# Patient Record
Sex: MALE | Race: OTHER | URBAN - METROPOLITAN AREA
[De-identification: names, ages, dates, MRNs, and addresses within clinical notes are randomized per-mention and may not be internally consistent; named-entity substitution may affect disease eponyms.]

---

## 2023-08-31 ENCOUNTER — OFFICE VISIT (OUTPATIENT)
Dept: AUDIOLOGY | Facility: CLINIC | Age: 78
End: 2023-08-31

## 2023-08-31 ENCOUNTER — OFFICE VISIT (OUTPATIENT)
Dept: OTOLARYNGOLOGY | Facility: CLINIC | Age: 78
End: 2023-08-31

## 2023-08-31 DIAGNOSIS — H91.20 SUDDEN-ONSET SENSORINEURAL HEARING LOSS: ICD-10-CM

## 2023-08-31 DIAGNOSIS — H93.13 TINNITUS OF BOTH EARS: Primary | ICD-10-CM

## 2023-08-31 DIAGNOSIS — H93.12 TINNITUS, LEFT: Primary | ICD-10-CM

## 2023-08-31 PROCEDURE — 92567 TYMPANOMETRY: CPT | Performed by: AUDIOLOGIST

## 2023-08-31 PROCEDURE — 1159F MED LIST DOCD IN RCRD: CPT | Performed by: AUDIOLOGIST

## 2023-08-31 PROCEDURE — 92557 COMPREHENSIVE HEARING TEST: CPT | Performed by: AUDIOLOGIST

## 2023-08-31 PROCEDURE — 99203 OFFICE O/P NEW LOW 30 MIN: CPT | Performed by: OTOLARYNGOLOGY

## 2023-08-31 RX ORDER — HYDROXYZINE HYDROCHLORIDE 25 MG/1
TABLET, FILM COATED ORAL
COMMUNITY
Start: 2023-03-14

## 2023-08-31 RX ORDER — MELOXICAM 15 MG/1
1 TABLET ORAL DAILY PRN
COMMUNITY

## 2023-08-31 RX ORDER — TRIAMCINOLONE ACETONIDE 1 MG/G
CREAM TOPICAL
COMMUNITY

## 2023-08-31 RX ORDER — ROSUVASTATIN CALCIUM 10 MG/1
1 TABLET, COATED ORAL DAILY
COMMUNITY
Start: 2022-04-03

## 2023-08-31 RX ORDER — SILDENAFIL 100 MG/1
TABLET, FILM COATED ORAL
COMMUNITY

## 2023-08-31 RX ORDER — GABAPENTIN 300 MG/1
300 CAPSULE ORAL 3 TIMES DAILY
COMMUNITY

## 2023-08-31 RX ORDER — ALFUZOSIN HYDROCHLORIDE 10 MG/1
1 TABLET, EXTENDED RELEASE ORAL DAILY
COMMUNITY
Start: 2022-09-04

## 2023-08-31 RX ORDER — FINASTERIDE 5 MG/1
1 TABLET, FILM COATED ORAL DAILY
COMMUNITY

## 2023-08-31 RX ORDER — HYDROCODONE BITARTRATE AND ACETAMINOPHEN 7.5; 325 MG/1; MG/1
TABLET ORAL
COMMUNITY

## 2023-08-31 RX ORDER — HYDROCODONE BITARTRATE AND ACETAMINOPHEN 10; 325 MG/1; MG/1
TABLET ORAL
COMMUNITY

## 2023-08-31 RX ORDER — ASPIRIN 81 MG/1
TABLET ORAL 2 TIMES DAILY WITH MEALS
COMMUNITY

## 2023-09-01 ENCOUNTER — TELEPHONE (OUTPATIENT)
Dept: OTOLARYNGOLOGY | Facility: CLINIC | Age: 78
End: 2023-09-01

## 2023-09-01 RX ORDER — PREDNISONE 10 MG/1
TABLET ORAL
Qty: 44 TABLET | Refills: 0 | Status: SHIPPED | OUTPATIENT
Start: 2023-09-01

## 2023-09-14 ENCOUNTER — OFFICE VISIT (OUTPATIENT)
Dept: OTOLARYNGOLOGY | Facility: CLINIC | Age: 78
End: 2023-09-14

## 2023-09-14 ENCOUNTER — OFFICE VISIT (OUTPATIENT)
Dept: AUDIOLOGY | Facility: CLINIC | Age: 78
End: 2023-09-14

## 2023-09-14 DIAGNOSIS — H93.12 TINNITUS, LEFT: Primary | ICD-10-CM

## 2023-09-14 DIAGNOSIS — H91.8X3 OTHER SPECIFIED HEARING LOSS OF BOTH EARS: Primary | ICD-10-CM

## 2023-09-14 PROCEDURE — 92557 COMPREHENSIVE HEARING TEST: CPT | Performed by: AUDIOLOGIST

## 2023-09-14 PROCEDURE — 1160F RVW MEDS BY RX/DR IN RCRD: CPT | Performed by: OTOLARYNGOLOGY

## 2023-09-14 PROCEDURE — 99214 OFFICE O/P EST MOD 30 MIN: CPT | Performed by: OTOLARYNGOLOGY

## 2023-09-14 PROCEDURE — 1159F MED LIST DOCD IN RCRD: CPT | Performed by: OTOLARYNGOLOGY

## 2024-04-18 ENCOUNTER — OFFICE VISIT (OUTPATIENT)
Dept: AUDIOLOGY | Facility: CLINIC | Age: 79
End: 2024-04-18

## 2024-04-18 ENCOUNTER — OFFICE VISIT (OUTPATIENT)
Dept: OTOLARYNGOLOGY | Facility: CLINIC | Age: 79
End: 2024-04-18
Payer: MEDICARE

## 2024-04-18 DIAGNOSIS — H91.20 SUDDEN-ONSET SENSORINEURAL HEARING LOSS: ICD-10-CM

## 2024-04-18 DIAGNOSIS — H93.13 TINNITUS OF BOTH EARS: ICD-10-CM

## 2024-04-18 DIAGNOSIS — H91.90 HEARING LOSS, UNSPECIFIED HEARING LOSS TYPE, UNSPECIFIED LATERALITY: Primary | ICD-10-CM

## 2024-04-18 DIAGNOSIS — H91.8X3 ASYMMETRICAL HEARING LOSS: Primary | ICD-10-CM

## 2024-04-18 PROCEDURE — 92557 COMPREHENSIVE HEARING TEST: CPT | Performed by: AUDIOLOGIST

## 2024-04-18 PROCEDURE — 92567 TYMPANOMETRY: CPT | Performed by: AUDIOLOGIST

## 2024-04-18 PROCEDURE — 99213 OFFICE O/P EST LOW 20 MIN: CPT | Performed by: OTOLARYNGOLOGY

## 2024-04-18 NOTE — PROGRESS NOTES
Srinivasan Ontiveros is a 79 year old male.    Chief Complaint   Patient presents with    Follow - Up     Patient is here due to hearing loss follow up and repeat follow up       HISTORY OF PRESENT ILLNESS  He presents with a history of ringing in his left ear that started on July 2.  States that he was in [place] at the time and went to see several physicians who gave him certain medications without any improvement in his symptoms.  States that he finally saw an ENT about 6 weeks out from having this problem and was told that he was too late to have any type of injections intratympanicly.  He now feels that his hearing has not changed and is here for another evaluation and recommendation.  Has not been on any steroids so far.  No other significant signs, symptoms or complaints other than hearing loss and tinnitus.  Known hearing loss on the right side in the past.  Audiogram performed today demonstrates normal downsloping to mild downsloping to moderate sensory hearing loss on the right and mild downsloping to severe sensory hearing loss on the left.  Speech discrimination scores of 40% on the left and 96% on the right.  Normal tympanograms.      9/15/23 last visit he was started on a prednisone burst and taper and fortunately has had no real improvement in his hearing.  Audiogram was repeated today demonstrating essentially unchanged left-sided hearing.  This started back in early July and unfortunately was treated for other problems before he met with an ENT who offered him treatment but stated that because he was more than a month out that he would not likely have good response to the treatment.  Here to discuss further management.  Repeat audiogram performed today demonstrates essentially no change in his left-sided hearing loss.  Frustrated by the chronic pain at that ear.     4/18/24 he feels that his hearing may be slightly worse but he is more frustrated with the constant incessant ringing in the left ear.  History  of sudden onset hearing loss seen in [place] by physician not treated for several weeks and finally saw an ENT after about 3 weeks and was offered no treatment due to the fact that it been too long.  I saw him several months later with unchanged hearing loss and persistent ringing.  Here for routine audiogram.  Audiogram performed today was reviewed by me interpreted by me and over the results with the patient.  This demonstrates unchanged left-sided hearing with normal downsloping to moderate sensory hearing loss on the right as well.  No new signs, symptoms or complaints.  Currently not wearing amplification      Social History     Socioeconomic History    Marital status:    Tobacco Use    Smoking status: Never    Smokeless tobacco: Never   Substance and Sexual Activity    Alcohol use: Never       History reviewed. No pertinent family history.    History reviewed. No pertinent past medical history.    Past Surgical History:   Procedure Laterality Date    Knee surgery Right     03/2023         REVIEW OF SYSTEMS    System Neg/Pos Details   Constitutional Negative Fatigue, fever and weight loss.   ENMT Negative Drooling.   Eyes Negative Blurred vision and vision changes.   Respiratory Negative Dyspnea and wheezing.   Cardio Negative Chest pain, irregular heartbeat/palpitations and syncope.   GI Negative Abdominal pain and diarrhea.   Endocrine Negative Cold intolerance and heat intolerance.   Neuro Negative Tremors.   Psych Negative Anxiety and depression.   Integumentary Negative Frequent skin infections, pigment change and rash.   Hema/Lymph Negative Easy bleeding and easy bruising.           PHYSICAL EXAM    There were no vitals taken for this visit.       Constitutional Normal Overall appearance - Normal.   Psychiatric Normal Orientation - Oriented to time, place, person & situation. Appropriate mood and affect.   Neck Exam Normal Inspection - Normal. Palpation - Normal. Parotid gland - Normal. Thyroid  gland - Normal.   Eyes Normal Conjunctiva - Right: Normal, Left: Normal. Pupil - Right: Normal, Left: Normal. Fundus - Right: Normal, Left: Normal.   Neurological Normal Memory - Normal. Cranial nerves - Cranial nerves II through XII grossly intact.   Head/Face Normal Facial features - Normal. Eyebrows - Normal. Skull - Normal.        Nasopharynx Normal External nose - Normal. Lips/teeth/gums - Normal. Tonsils - Normal. Oropharynx - Normal.   Ears Normal Inspection - Right: Normal, Left: Normal. Canal - Right: Normal, Left: Normal. TM - Right: Normal, Left: Normal.   Skin Normal Inspection - Normal.        Lymph Detail Normal Submental. Submandibular. Anterior cervical. Posterior cervical. Supraclavicular.        Nose/Mouth/Throat Normal External nose - Normal. Lips/teeth/gums - Normal. Tonsils - Normal. Oropharynx - Normal.   Nose/Mouth/Throat Normal Nares - Right: Normal Left: Normal. Septum -Normal  Turbinates - Right: Normal, Left: Normal.       Current Outpatient Medications:     predniSONE 10 MG Oral Tab, 6 tablets daily day one through 5, 4 tablets daily on days  6 and 7, 2 tablets daily on days 8 and 9, One tablet daily on days 10 and 11., Disp: 44 tablet, Rfl: 0    aspirin 81 MG Oral Tab EC, 2 (two) times daily with meals., Disp: , Rfl:     HYDROcodone-acetaminophen  MG Oral Tab, Take 1 tablet every 4 hours by oral route., Disp: , Rfl:     HYDROcodone-acetaminophen 7.5-325 MG Oral Tab, Every 4 Hours as needed for Pain, Disp: , Rfl:     Meloxicam 15 MG Oral Tab, Take 1 tablet (15 mg total) by mouth daily as needed., Disp: , Rfl:     rosuvastatin 10 MG Oral Tab, Take 1 tablet (10 mg total) by mouth daily., Disp: , Rfl:     Sildenafil Citrate 100 MG Oral Tab, TAKE 1 TABLET BY MOUTH DAILY 1 HOUR BEFORE NEEDED, Disp: , Rfl:     hydrOXYzine 25 MG Oral Tab, Take 1 tablet every 4-6 hours by oral route for 5 days., Disp: , Rfl:     alfuzosin ER 10 MG Oral Tablet 24 Hr, Take 1 tablet (10 mg total) by mouth  daily., Disp: , Rfl:     finasteride 5 MG Oral Tab, Take 1 tablet (5 mg total) by mouth daily., Disp: , Rfl:     gabapentin 300 MG Oral Cap, Take 1 capsule (300 mg total) by mouth 3 (three) times daily., Disp: , Rfl:     triamcinolone 0.1 % External Cream, APPLY 1 APPLICATION TOPICALLY TO AFFECTED AREA 2 TIMES PER DAY, Disp: , Rfl:   ASSESSMENT AND PLAN    1. Hearing loss, unspecified hearing loss type, unspecified laterality  - Audiology Referral - [facility])    2. Tinnitus of both ears    3. Sudden-onset sensorineural hearing loss  Audiogram reviewed essentially no change in his left-sided hearing loss.  He does have a right-sided loss as well but not as bad as the left side.  I did recommend that he consider amplification for both ears.  Very frustrated that there is no cure for his left-sided tinnitus.  I did recommend repeating hearing test in a year to watch for any interval change in his hearing.  Medical clearance letter for amplification given to patient.        This note was prepared using Dragon [employer] voice recognition dictation software. As a result errors may occur. When identified these errors have been corrected. While every attempt is made to correct errors during dictation discrepancies may still exist    Tate Montano MD    4/18/2024    9:07 AM